# Patient Record
Sex: FEMALE | URBAN - METROPOLITAN AREA
[De-identification: names, ages, dates, MRNs, and addresses within clinical notes are randomized per-mention and may not be internally consistent; named-entity substitution may affect disease eponyms.]

---

## 2022-05-18 DIAGNOSIS — K59.00 CONSTIPATION: ICD-10-CM

## 2022-05-18 DIAGNOSIS — R14.0 BLOATING: ICD-10-CM

## 2022-05-18 DIAGNOSIS — K90.49 FOOD INTOLERANCE: Primary | ICD-10-CM

## 2022-05-20 ENCOUNTER — TELEPHONE (OUTPATIENT)
Dept: GASTROENTEROLOGY | Facility: CLINIC | Age: 47
End: 2022-05-20
Payer: COMMERCIAL

## 2022-05-20 NOTE — TELEPHONE ENCOUNTER
----- Message from Taty Meraz sent at 5/20/2022  9:11 AM CDT -----  .Type:  Patient Returning Call    Who Called:self  Who Left Message for Patient:Marjorie  Does the patient know what this is regarding?:yes  Would the patient rather a call back or a response via [facility]? Call back  Best Call Back Number:.[phone]    Additional Information: